# Patient Record
Sex: MALE | Race: WHITE | Employment: STUDENT | URBAN - METROPOLITAN AREA
[De-identification: names, ages, dates, MRNs, and addresses within clinical notes are randomized per-mention and may not be internally consistent; named-entity substitution may affect disease eponyms.]

---
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
## 2023-01-06 ENCOUNTER — OFFICE VISIT (OUTPATIENT)
Dept: PEDIATRICS CLINIC | Facility: CLINIC | Age: 18
End: 2023-01-06
Payer: COMMERCIAL

## 2023-01-06 VITALS
HEIGHT: 67 IN | HEART RATE: 85 BPM | DIASTOLIC BLOOD PRESSURE: 72 MMHG | SYSTOLIC BLOOD PRESSURE: 124 MMHG | BODY MASS INDEX: 30.79 KG/M2 | WEIGHT: 196.19 LBS

## 2023-01-06 DIAGNOSIS — E66.3 PEDIATRIC OVERWEIGHT: ICD-10-CM

## 2023-01-06 DIAGNOSIS — Z00.00 WELL ADULT HEALTH CHECK: Primary | ICD-10-CM

## 2023-01-06 DIAGNOSIS — Z91.010 PEANUT ALLERGY: ICD-10-CM

## 2023-01-06 DIAGNOSIS — Z71.3 DIETARY COUNSELING AND SURVEILLANCE: ICD-10-CM

## 2023-01-06 DIAGNOSIS — Z71.82 EXERCISE COUNSELING: ICD-10-CM

## 2023-01-06 PROBLEM — L91.0 KELOID OF SKIN: Status: ACTIVE | Noted: 2023-01-06

## 2023-01-06 PROCEDURE — 3078F DIAST BP <80 MM HG: CPT | Performed by: PEDIATRICS

## 2023-01-06 PROCEDURE — 99395 PREV VISIT EST AGE 18-39: CPT | Performed by: PEDIATRICS

## 2023-01-06 PROCEDURE — 3074F SYST BP LT 130 MM HG: CPT | Performed by: PEDIATRICS

## 2023-01-06 PROCEDURE — 3008F BODY MASS INDEX DOCD: CPT | Performed by: PEDIATRICS

## 2023-01-06 NOTE — PATIENT INSTRUCTIONS
For ADD eval - call for full eval:    Joy Vargas, PhD Pediatric Neuropsychology [address] [facility]  Frieda Oneil [address] [place], [place], [address] [facility]; [phone]  Dylon Bojorquez, PhD, [facility], [place] [phone]    Stay very active - lots of outdoor time and as little screen time as possible  No sugary drinks - pop, juices, diet drinks, Justo-Aid; water and whole milk only (special occasions - OK); this is key  Avoid processed grains, refined carbohydrates and \"fake\" foods - cereals, things that come in boxes and bags; if you can't grow it, gather it or kill/ it, best not to eat it. Focus on QUALITY of food, not quantity  Eat 2-3 meals a day; no snacking (one exception - child has an early lunch at school and dinner not served until later in evening at home)  A higher protein/fat breakfast does help control hunger hormones throughout the day  No calorie counting - doesn't help and is frustrating  Fresh vegetables, fruits, greens, nuts, eggs, beans/lentils, lean meats and fish should form the bulk of ones diet  Eat things from around the periphery of the grocery store; avoid the center as much as possible  Do NOT be afraid of fat; things higher in fat like olive oil, avocado, butter, lean meats, fish are fine  Eggs are a great thing to eat regularly - worries about the cholesterol in the yolk are unfounded.  An egg (or two) a day is fine      Meningitis B vaccine before going to 4 yr school (if living in dorm or apartment with others)    Next well visit age 19 with adult medicine

## 2023-07-05 ENCOUNTER — OFFICE VISIT (OUTPATIENT)
Dept: FAMILY MEDICINE CLINIC | Facility: CLINIC | Age: 18
End: 2023-07-05
Payer: COMMERCIAL

## 2023-07-05 VITALS
DIASTOLIC BLOOD PRESSURE: 72 MMHG | WEIGHT: 185 LBS | RESPIRATION RATE: 18 BRPM | SYSTOLIC BLOOD PRESSURE: 122 MMHG | BODY MASS INDEX: 29.73 KG/M2 | TEMPERATURE: 98 F | HEIGHT: 66 IN | HEART RATE: 74 BPM | OXYGEN SATURATION: 98 %

## 2023-07-05 DIAGNOSIS — R06.2 WHEEZING: ICD-10-CM

## 2023-07-05 DIAGNOSIS — S01.332A PIERCED EAR INFECTION, LEFT, INITIAL ENCOUNTER: Primary | ICD-10-CM

## 2023-07-05 DIAGNOSIS — R05.2 SUBACUTE COUGH: ICD-10-CM

## 2023-07-05 DIAGNOSIS — L08.9 PIERCED EAR INFECTION, LEFT, INITIAL ENCOUNTER: Primary | ICD-10-CM

## 2023-07-05 PROCEDURE — 3008F BODY MASS INDEX DOCD: CPT | Performed by: FAMILY MEDICINE

## 2023-07-05 PROCEDURE — 99213 OFFICE O/P EST LOW 20 MIN: CPT | Performed by: FAMILY MEDICINE

## 2023-07-05 PROCEDURE — 3078F DIAST BP <80 MM HG: CPT | Performed by: FAMILY MEDICINE

## 2023-07-05 PROCEDURE — 3074F SYST BP LT 130 MM HG: CPT | Performed by: FAMILY MEDICINE

## 2023-07-05 RX ORDER — CLINDAMYCIN HYDROCHLORIDE 300 MG/1
300 CAPSULE ORAL 3 TIMES DAILY
Qty: 21 CAPSULE | Refills: 0 | Status: SHIPPED | OUTPATIENT
Start: 2023-07-05 | End: 2023-07-12

## 2023-07-05 RX ORDER — FLUTICASONE PROPIONATE 110 UG/1
AEROSOL, METERED RESPIRATORY (INHALATION)
Qty: 1 EACH | Refills: 0 | Status: SHIPPED | OUTPATIENT
Start: 2023-07-05 | End: 2023-07-26

## 2023-07-05 RX ORDER — ALBUTEROL SULFATE 90 UG/1
AEROSOL, METERED RESPIRATORY (INHALATION)
Qty: 1 EACH | Refills: 0 | Status: SHIPPED | OUTPATIENT
Start: 2023-07-05

## 2023-07-05 NOTE — PATIENT INSTRUCTIONS
If ear piercing site does not improve within 48 hours or if it worsens before this, please remove the earring and continue the clindamycin. Stop the cephalexin while you are taking th clindamycin. If the cough/wheezing does not resolve with the inhalers please see your PCP for further evaluation. It might be of benefit to start an antihistamine such as Claritin (loratadine) 10 mg once daily.

## 2023-07-09 NOTE — PROGRESS NOTES
Patient request for surgery signed by patient and witnessed and signed by RN. Prescription for and narcotic prescription electronically sent to pharmacy per Dr. Jeffrey Huff order and patient instructed to  prescription before surgery. Pre-Surgical Instruction Handout, Hand Elevation Handout, Dressing Protector Handout, and Post-Operative Instruction Handout given to and reviewed w/patient. All questions and concerns answered; pt verbalized an understanding of all pre-operative teaching. Patient instructed to call the office with any further questions and/or concerns. Patient escorted to surgery scheduling to schedule surgery and post-operative appointments.
Volar proximal phalanx RTH mass   LOV 8/9/21  No changes since LOV here for surgical intervention   Pt complains mass gets in the way of gripping objects due to size of mass
General

## 2023-07-10 ENCOUNTER — HOSPITAL ENCOUNTER (OUTPATIENT)
Age: 18
Discharge: HOME | End: 2023-07-10
Payer: COMMERCIAL

## 2023-07-10 VITALS
TEMPERATURE: 98 F | RESPIRATION RATE: 18 BRPM | SYSTOLIC BLOOD PRESSURE: 127 MMHG | HEIGHT: 66 IN | BODY MASS INDEX: 29.73 KG/M2 | WEIGHT: 185 LBS | OXYGEN SATURATION: 97 % | HEART RATE: 91 BPM | DIASTOLIC BLOOD PRESSURE: 69 MMHG

## 2023-07-10 DIAGNOSIS — T14.8XXA FOREIGN BODY IN SKIN: Primary | ICD-10-CM

## 2023-07-10 PROCEDURE — 99203 OFFICE O/P NEW LOW 30 MIN: CPT | Performed by: NURSE PRACTITIONER

## 2023-07-10 NOTE — ED INITIAL ASSESSMENT (HPI)
Pt with earring stud stuck in left ear lobe. Pt was seen at [facility] last week for ear pain/redness, was unaware of earring stuck in ear at that time.

## 2024-07-02 ENCOUNTER — OFFICE VISIT (OUTPATIENT)
Dept: FAMILY MEDICINE CLINIC | Facility: CLINIC | Age: 19
End: 2024-07-02
Payer: COMMERCIAL

## 2024-07-02 VITALS
HEART RATE: 102 BPM | RESPIRATION RATE: 18 BRPM | HEIGHT: 66 IN | SYSTOLIC BLOOD PRESSURE: 133 MMHG | BODY MASS INDEX: 28.93 KG/M2 | WEIGHT: 180 LBS | TEMPERATURE: 100 F | DIASTOLIC BLOOD PRESSURE: 78 MMHG | OXYGEN SATURATION: 96 %

## 2024-07-02 DIAGNOSIS — J11.1 INFLUENZA-LIKE ILLNESS: ICD-10-CM

## 2024-07-02 DIAGNOSIS — J02.9 SORE THROAT: Primary | ICD-10-CM

## 2024-07-02 LAB
CONTROL LINE PRESENT WITH A CLEAR BACKGROUND (YES/NO): YES YES/NO
KIT LOT #: NORMAL NUMERIC
OPERATOR ID: NORMAL
POCT LOT NUMBER: NORMAL
RAPID SARS-COV-2 BY PCR: NOT DETECTED

## 2024-07-02 PROCEDURE — U0002 COVID-19 LAB TEST NON-CDC: HCPCS | Performed by: NURSE PRACTITIONER

## 2024-07-02 PROCEDURE — 99213 OFFICE O/P EST LOW 20 MIN: CPT | Performed by: NURSE PRACTITIONER

## 2024-07-02 PROCEDURE — 87880 STREP A ASSAY W/OPTIC: CPT | Performed by: NURSE PRACTITIONER

## 2024-07-03 NOTE — PROGRESS NOTES
CHIEF COMPLAINT:     Chief Complaint   Patient presents with    Sore Throat     Symptoms started today : headache, fever, sore throat, and nasal congestion.  OTC: tylenol   NO exposure        HPI:   Demetrius Antoine is a 18 year old male who presents for sore throat, fevers, chills, body aches, and intermittent headaches.  Patient reports symptoms started yesterday.  Denies any cough, wheezing, chest discomfort, or shortness of breath. .  Treating symptoms with tylenol.   Tolerates PO well at home. No n/v/d.  Denies any other aggravating or relieving factors at home. Denies any other treatment attempts prior to arrival. Denies known covid-19 or strep exposure.        Current Outpatient Medications   Medication Sig Dispense Refill    albuterol (PROAIR HFA) 108 (90 Base) MCG/ACT Inhalation Aero Soln 1-2 puffs every 4-6 hours for 2-3 days during waking hours then prn. (Patient not taking: Reported on 7/10/2023) 1 each 0    Clindamycin Phosphate 1 % External Gel Apply 1 Application. topically 2 (two) times daily. Apply thin film to the affected area(s). (Patient not taking: Reported on 7/5/2023) 60 g 5    cephalexin 500 MG Oral Cap Take 1 capsule (500 mg total) by mouth 2 (two) times daily. (Patient not taking: Reported on 7/10/2023) 60 capsule 5    EPINEPHrine 0.3 MG/0.3ML Injection Solution Auto-injector Inject 0.3 mL (1 each total) into the muscle as needed. (Patient not taking: Reported on 6/7/2022) 1 each 1      Past Medical History:    Allergic rhinitis    Exophoria    Extrinsic asthma, unspecified    Mild intermittent asthma, rarely uses Albuterol    Finger fracture, left    fifth finger    Hyperopia of both eyes with astigmatism    Pigmented villonodular synovitis of right hand    Excision Right Thumb GC tumor    PONV (postoperative nausea and vomiting)    Pseudostrabismus    Tears of meniscus and ACL of left knee    Trichiasis of eyelid without entropion    aberrant lash    Visual impairment    glasses       Past Surgical History:   Procedure Laterality Date    Exc hand roseanne deep > 1.5 cm Right 03/28/2022    Excision Right Thumb GC tumor    Knee arthroscopy Left     ACL reconstruction    Other Left     closed reduction, percutaneous pinning left SF     Other Left     5th finger pinning         Social History     Socioeconomic History    Marital status: Single   Tobacco Use    Smoking status: Never    Smokeless tobacco: Never   Vaping Use    Vaping status: Never Used   Substance and Sexual Activity    Alcohol use: No     Alcohol/week: 0.0 standard drinks of alcohol    Drug use: No   Other Topics Concern    Caffeine Concern No    Second-hand smoke exposure No    Right Handed Yes   Social History Narrative    ** Merged History Encounter **         School: [employer]    Grade: 5TH    Activity: Basketball         REVIEW OF SYSTEMS:   GENERAL: + fevers. Notes good appetite  SKIN: no rashes or abnormal skin lesions  HEENT: + sore throat, + sinus symptoms, Denies ear pain  LUNGS: Denies cough, denies shortness of breath or wheezing,   CARDIOVASCULAR: denies chest pain or palpitations   GI: denies N/V/C or abdominal pain  NEURO:+ intermittent headaches. Denies numbness/tingling or weakness.    EXAM:   /78 (BP Location: Right arm, Patient Position: Sitting, Cuff Size: adult)   Pulse 102   Temp 99.5 °F (37.5 °C) (Temporal)   Resp 18   Ht 5' 6\" (1.676 m)   Wt 180 lb (81.6 kg)   SpO2 96%   BMI 29.05 kg/m²   GENERAL: well developed, well nourished,in no apparent distress  SKIN: no rashes,no suspicious lesions  HEAD: atraumatic, normocephalic.    EYES: conjunctiva clear, PERRLA, EOM intact  EARS: TM's intact and without erythema, no bulging, no retraction,no fluid, bony landmarks visualized. No erythema or swelling noted to ear canals or external ears.   NOSE: Nostrils patent, clear nasal discharge, nasal mucosa reddened   THROAT: Oral mucosa pink, moist. Posterior pharynx is  erythematous. No exudates. No tonsillar  hypertrophy noted.  No trismus. Uvula midline with no swelling. Voice clear/normal. No stridor  NECK: Supple, non-tender  LUNGS: clear to auscultation bilaterally, no rales, wheezes or rhonchi. Breathing is non labored.  CARDIO: RRR without murmur  NEURO: Alert & Oriented x 3. Articulation intact. 5/5 UE and LE strength bilaterally. No sensory deficit. Normal muscle tone. Coordination normal. Normal gait. No facial droop.  EXTREMITIES: no cyanosis, clubbing or edema  LYMPH:  No lymphadenopathy.        ASSESSMENT AND PLAN:       ICD-10-CM    1. Sore throat  J02.9 Strep A Assay W/Optic     Rapid Covid-19      2. Influenza-like illness  J11.1         Covid-19 test negative  Rapid strep negative.   Viral panel testing declined.    Discussed physical exam and hpi with pt.  Pt has reassuring physical exam consistent with pharyngitis and  flu like symtpoms.  No signs of pta or retropharyngeal infection.Lungs clear bilat. No respiratory distress noted. We discussed covid-19 vs strep vs other etiologies. Rapid strep test negative. Covid-19 test sent to lab.  Treatment options discussed with patient and explained in detail. We reviewed symptomatic care at home. The risks, benefits and potential side effects of possible medications were reviewed. Alternatives were discussed. Monitoring parameters and expected course outlined. We reviewed self quarantine guidelines. Patient to call PCP or go to emergency department if symptoms fail to respond as outlined, or worsen in any way. The patient agreed with the plan.      Patient Instructions   1. Rest. Drink plenty of fluids.  2. Tylenol/Ibuprofen for pain/fevers.  3. Salt water gargles three times daily  4. Use humidifier at home when possible.  5. The rapid strep test was negative today.   6. Covid-19 test negative.    7. Follow up with PMD in 4-5 days for re-eval. Go to the emergency department immediately if symptoms worsen, change, you develop chest discomfort, wheezing,  shortness of breath, or if you have any concerns.        The patient indicates understanding of these issues and agrees to the plan.

## 2024-07-03 NOTE — PATIENT INSTRUCTIONS
1. Rest. Drink plenty of fluids.  2. Tylenol/Ibuprofen for pain/fevers.  3. Salt water gargles three times daily  4. Use humidifier at home when possible.  5. The rapid strep test was negative today.   6. Covid-19 test negative.    7. Follow up with PMD in 4-5 days for re-eval. Go to the emergency department immediately if symptoms worsen, change, you develop chest discomfort, wheezing, shortness of breath, or if you have any concerns.

## 2024-10-22 ENCOUNTER — PATIENT MESSAGE (OUTPATIENT)
Dept: PEDIATRICS CLINIC | Facility: CLINIC | Age: 19
End: 2024-10-22

## 2025-03-27 ENCOUNTER — OFFICE VISIT (OUTPATIENT)
Dept: FAMILY MEDICINE CLINIC | Facility: CLINIC | Age: 20
End: 2025-03-27
Payer: COMMERCIAL

## 2025-03-27 VITALS
RESPIRATION RATE: 16 BRPM | SYSTOLIC BLOOD PRESSURE: 118 MMHG | TEMPERATURE: 98 F | HEIGHT: 66 IN | WEIGHT: 199 LBS | HEART RATE: 70 BPM | OXYGEN SATURATION: 97 % | DIASTOLIC BLOOD PRESSURE: 62 MMHG | BODY MASS INDEX: 31.98 KG/M2

## 2025-03-27 DIAGNOSIS — Z13.0 SCREENING FOR ENDOCRINE, NUTRITIONAL, METABOLIC AND IMMUNITY DISORDER: ICD-10-CM

## 2025-03-27 DIAGNOSIS — Z13.228 SCREENING FOR ENDOCRINE, NUTRITIONAL, METABOLIC AND IMMUNITY DISORDER: ICD-10-CM

## 2025-03-27 DIAGNOSIS — Z13.6 SCREENING FOR HEART DISEASE: ICD-10-CM

## 2025-03-27 DIAGNOSIS — Z00.00 ANNUAL PHYSICAL EXAM: Primary | ICD-10-CM

## 2025-03-27 DIAGNOSIS — Z13.29 SCREENING FOR ENDOCRINE, NUTRITIONAL, METABOLIC AND IMMUNITY DISORDER: ICD-10-CM

## 2025-03-27 DIAGNOSIS — Z13.21 SCREENING FOR ENDOCRINE, NUTRITIONAL, METABOLIC AND IMMUNITY DISORDER: ICD-10-CM

## 2025-03-27 DIAGNOSIS — Z91.018 FOOD ALLERGY: ICD-10-CM

## 2025-03-27 DIAGNOSIS — Z13.0 SCREENING FOR IRON DEFICIENCY ANEMIA: ICD-10-CM

## 2025-03-27 PROBLEM — E66.3 PEDIATRIC OVERWEIGHT: Status: RESOLVED | Noted: 2018-10-23 | Resolved: 2025-03-27

## 2025-03-27 PROBLEM — E66.9 OBESITY WITHOUT SERIOUS COMORBIDITY: Status: ACTIVE | Noted: 2025-03-27

## 2025-03-27 PROCEDURE — 99385 PREV VISIT NEW AGE 18-39: CPT | Performed by: FAMILY MEDICINE

## 2025-03-27 RX ORDER — EPINEPHRINE 0.3 MG/.3ML
0.3 INJECTION SUBCUTANEOUS AS NEEDED
Qty: 1 EACH | Refills: 1 | Status: SHIPPED | OUTPATIENT
Start: 2025-03-27

## 2025-03-27 NOTE — PROGRESS NOTES
Subjective: male who presents for complete px.  He is a new pt to the clinic.    The patient reports:  No changes in nevi   No changes in vision     Needs epi pen refill.  Has food allergies.        History/Other:    Chief Complaint Reviewed and Verified  Nursing Notes Reviewed and   Verified  Tobacco Reviewed  Allergies Reviewed  Medications Reviewed    Medical History Reviewed  Surgical History Reviewed  Family History   Reviewed  Social History Reviewed         Tobacco:  He has never smoked tobacco.    Current Outpatient Medications   Medication Sig Dispense Refill    EPINEPHrine 0.3 MG/0.3ML Injection Solution Auto-injector Inject 0.3 mL (1 each total) into the muscle as needed. 1 each 1    cephALEXin 500 MG Oral Cap Take 1 capsule (500 mg total) by mouth 2 (two) times daily. 60 capsule 5    Clindamycin Phosphate 1 % External Gel Apply 1 Application  topically 2 (two) times daily. Apply thin film to the affected area(s). 60 g 5         Review of Systems:  See HPI    Objective:   /62 (BP Location: Left arm, Patient Position: Sitting, Cuff Size: adult)   Pulse 70   Temp 97.7 °F (36.5 °C) (Temporal)   Resp 16   Ht 5' 6\" (1.676 m)   Wt 199 lb (90.3 kg)   SpO2 97%   BMI 32.12 kg/m²  Estimated body mass index is 32.12 kg/m² as calculated from the following:    Height as of this encounter: 5' 6\" (1.676 m).    Weight as of this encounter: 199 lb (90.3 kg).    Physical Exam  Constitutional:       General: He is awake.      Appearance: Normal appearance. He is well-developed. He is obese.   HENT:      Right Ear: Tympanic membrane and ear canal normal.      Left Ear: Tympanic membrane and ear canal normal.      Mouth/Throat:      Mouth: Mucous membranes are moist.      Pharynx: Oropharynx is clear.   Eyes:      Pupils: Pupils are equal, round, and reactive to light.   Cardiovascular:      Rate and Rhythm: Normal rate and regular rhythm.      Pulses: Normal pulses.      Heart sounds: No murmur heard.      No friction rub. No gallop.   Pulmonary:      Effort: Pulmonary effort is normal. No respiratory distress.      Breath sounds: No wheezing, rhonchi or rales.   Abdominal:      General: Bowel sounds are normal. There is no distension.      Palpations: Abdomen is soft.      Tenderness: There is no abdominal tenderness.      Comments: Obese    Musculoskeletal:         General: No tenderness.      Cervical back: Neck supple.      Right lower leg: No edema.      Left lower leg: No edema.   Lymphadenopathy:      Cervical: No cervical adenopathy.   Skin:     General: Skin is warm.   Neurological:      General: No focal deficit present.      Mental Status: He is alert.   Psychiatric:         Mood and Affect: Mood normal.         Speech: Speech normal.         Behavior: Behavior normal. Behavior is cooperative.         Assessment & Plan:   1. Annual physical exam    2. Food allergy    3. Screening for endocrine, nutritional, metabolic and immunity disorder    4. Screening for heart disease    5. Screening for iron deficiency anemia         Screening labs-cmp, lipids, cbc, tsh  Refilled epi pen for food allergies   Immunizations-declined meningitis group b vaccine  Patient counseling: Nutrition:stressed importance of moderation in sodium/caffeine intake, saturated fat and cholesterol, caloric balance, sufficient intake of fresh fruits, vegetables, fiber, calcium, iron.  Exercise: stressed the importance of regular exercise   F/u 1 yr for next physical or sooner if needed     Precious Schaeffer DO, 03/27/25, 10:21 PM

## 2025-03-28 ENCOUNTER — PATIENT MESSAGE (OUTPATIENT)
Dept: FAMILY MEDICINE CLINIC | Facility: CLINIC | Age: 20
End: 2025-03-28

## 2025-03-28 DIAGNOSIS — Z13.89 SCREENING FOR NEUROLOGICAL CONDITION: Primary | ICD-10-CM

## 2025-03-28 NOTE — TELEPHONE ENCOUNTER
LOV 03/27/25: New pt. Physical    Pt. states that his therapist would like for him to get an ADHD screening. Is this something that he can come into the office for with , or would patient need a referral for a Psychiatrist?    Please advise!

## 2025-03-28 NOTE — TELEPHONE ENCOUNTER
Would either need to go through psychiatry or get neuro psych testing.  Please let me know what he wants to do and if wants psychiatry-pend  referral

## 2025-05-16 ENCOUNTER — NURSE ONLY (OUTPATIENT)
Dept: FAMILY MEDICINE CLINIC | Facility: CLINIC | Age: 20
End: 2025-05-16
Payer: COMMERCIAL

## 2025-05-16 DIAGNOSIS — Z00.00 ANNUAL PHYSICAL EXAM: ICD-10-CM

## 2025-05-16 DIAGNOSIS — Z13.6 SCREENING FOR HEART DISEASE: ICD-10-CM

## 2025-05-16 DIAGNOSIS — Z13.0 SCREENING FOR IRON DEFICIENCY ANEMIA: ICD-10-CM

## 2025-05-16 DIAGNOSIS — Z13.21 SCREENING FOR ENDOCRINE, NUTRITIONAL, METABOLIC AND IMMUNITY DISORDER: ICD-10-CM

## 2025-05-16 DIAGNOSIS — Z13.0 SCREENING FOR ENDOCRINE, NUTRITIONAL, METABOLIC AND IMMUNITY DISORDER: ICD-10-CM

## 2025-05-16 DIAGNOSIS — Z13.29 SCREENING FOR ENDOCRINE, NUTRITIONAL, METABOLIC AND IMMUNITY DISORDER: ICD-10-CM

## 2025-05-16 DIAGNOSIS — Z13.228 SCREENING FOR ENDOCRINE, NUTRITIONAL, METABOLIC AND IMMUNITY DISORDER: ICD-10-CM

## 2025-05-16 LAB
ALBUMIN SERPL-MCNC: 4.9 G/DL (ref 3.2–4.8)
ALBUMIN/GLOB SERPL: 2 {RATIO} (ref 1–2)
ALP LIVER SERPL-CCNC: 63 U/L (ref 45–117)
ALT SERPL-CCNC: 108 U/L (ref 10–49)
ANION GAP SERPL CALC-SCNC: 7 MMOL/L (ref 0–18)
AST SERPL-CCNC: 99 U/L (ref ?–34)
BASOPHILS # BLD AUTO: 0.02 X10(3) UL (ref 0–0.2)
BASOPHILS NFR BLD AUTO: 0.4 %
BILIRUB SERPL-MCNC: 1 MG/DL (ref 0.3–1.2)
BUN BLD-MCNC: 11 MG/DL (ref 9–23)
CALCIUM BLD-MCNC: 10.2 MG/DL (ref 8.7–10.6)
CHLORIDE SERPL-SCNC: 105 MMOL/L (ref 98–112)
CHOLEST SERPL-MCNC: 159 MG/DL (ref ?–200)
CO2 SERPL-SCNC: 26 MMOL/L (ref 21–32)
CREAT BLD-MCNC: 0.99 MG/DL (ref 0.7–1.3)
EGFRCR SERPLBLD CKD-EPI 2021: 111 ML/MIN/1.73M2 (ref 60–?)
EOSINOPHIL # BLD AUTO: 0.08 X10(3) UL (ref 0–0.7)
EOSINOPHIL NFR BLD AUTO: 1.5 %
ERYTHROCYTE [DISTWIDTH] IN BLOOD BY AUTOMATED COUNT: 12.6 %
FASTING PATIENT LIPID ANSWER: YES
FASTING STATUS PATIENT QL REPORTED: YES
GLOBULIN PLAS-MCNC: 2.4 G/DL (ref 2–3.5)
GLUCOSE BLD-MCNC: 89 MG/DL (ref 70–99)
HCT VFR BLD AUTO: 48.5 % (ref 39–53)
HDLC SERPL-MCNC: 64 MG/DL (ref 40–59)
HGB BLD-MCNC: 17.2 G/DL (ref 13–17.5)
IMM GRANULOCYTES # BLD AUTO: 0.01 X10(3) UL (ref 0–1)
IMM GRANULOCYTES NFR BLD: 0.2 %
LDLC SERPL CALC-MCNC: 82 MG/DL (ref ?–100)
LYMPHOCYTES # BLD AUTO: 2.04 X10(3) UL (ref 1–4)
LYMPHOCYTES NFR BLD AUTO: 38.2 %
MCH RBC QN AUTO: 29.5 PG (ref 26–34)
MCHC RBC AUTO-ENTMCNC: 35.5 G/DL (ref 31–37)
MCV RBC AUTO: 83 FL (ref 80–100)
MONOCYTES # BLD AUTO: 0.49 X10(3) UL (ref 0.1–1)
MONOCYTES NFR BLD AUTO: 9.2 %
NEUTROPHILS # BLD AUTO: 2.7 X10 (3) UL (ref 1.5–7.7)
NEUTROPHILS # BLD AUTO: 2.7 X10(3) UL (ref 1.5–7.7)
NEUTROPHILS NFR BLD AUTO: 50.5 %
NONHDLC SERPL-MCNC: 95 MG/DL (ref ?–130)
OSMOLALITY SERPL CALC.SUM OF ELEC: 285 MOSM/KG (ref 275–295)
PLATELET # BLD AUTO: 218 10(3)UL (ref 150–450)
POTASSIUM SERPL-SCNC: 4 MMOL/L (ref 3.5–5.1)
PROT SERPL-MCNC: 7.3 G/DL (ref 5.7–8.2)
RBC # BLD AUTO: 5.84 X10(6)UL (ref 4.3–5.7)
SODIUM SERPL-SCNC: 138 MMOL/L (ref 136–145)
TRIGL SERPL-MCNC: 68 MG/DL (ref 30–149)
TSI SER-ACNC: 1.26 UIU/ML (ref 0.55–4.78)
VLDLC SERPL CALC-MCNC: 11 MG/DL (ref 0–30)
WBC # BLD AUTO: 5.3 X10(3) UL (ref 4–11)

## 2025-05-16 PROCEDURE — 80053 COMPREHEN METABOLIC PANEL: CPT | Performed by: FAMILY MEDICINE

## 2025-05-16 PROCEDURE — 80061 LIPID PANEL: CPT | Performed by: FAMILY MEDICINE

## 2025-05-16 PROCEDURE — 84443 ASSAY THYROID STIM HORMONE: CPT | Performed by: FAMILY MEDICINE

## 2025-05-16 PROCEDURE — 85025 COMPLETE CBC W/AUTO DIFF WBC: CPT | Performed by: FAMILY MEDICINE

## 2025-05-16 NOTE — PROGRESS NOTES
Patient came in for draw of ordered fasting labs. Patient drawn out of Left AC, x 1 attempt and tolerated well.  Lavender, light green tube drawn.

## 2025-05-19 ENCOUNTER — TELEPHONE (OUTPATIENT)
Dept: FAMILY MEDICINE CLINIC | Facility: CLINIC | Age: 20
End: 2025-05-19

## 2025-05-19 DIAGNOSIS — R79.89 ELEVATED LFTS: Primary | ICD-10-CM

## 2025-05-19 NOTE — TELEPHONE ENCOUNTER
Pt. sent a follow up message in regards to lab results.   Pt. states he has been taking cephalexin in regards to his acne.    Should pt. stop taking this in regards to his elevated liver enzymes?    Please advise.

## 2025-05-19 NOTE — TELEPHONE ENCOUNTER
Notify patient that his LFTs were quite elevated.  He is to abstain from alcohol.  Avoid taking any supplements he may be taking.  Abstain from Tylenol use.  Recommend rechecking in 6 weeks.  If they are still elevated at this time, will recommend a liver ultrasound

## 2025-07-15 ENCOUNTER — LAB ENCOUNTER (OUTPATIENT)
Dept: LAB | Facility: HOSPITAL | Age: 20
End: 2025-07-15
Attending: FAMILY MEDICINE
Payer: COMMERCIAL

## 2025-07-15 DIAGNOSIS — R79.89 ELEVATED LFTS: ICD-10-CM

## 2025-07-15 LAB
ALBUMIN SERPL-MCNC: 5 G/DL (ref 3.2–4.8)
ALP LIVER SERPL-CCNC: 72 U/L (ref 45–117)
ALT SERPL-CCNC: 60 U/L (ref 10–49)
AST SERPL-CCNC: 28 U/L (ref ?–34)
BILIRUB DIRECT SERPL-MCNC: 0.2 MG/DL (ref ?–0.3)
BILIRUB SERPL-MCNC: 0.8 MG/DL (ref 0.3–1.2)
PROT SERPL-MCNC: 7.3 G/DL (ref 5.7–8.2)

## 2025-07-15 PROCEDURE — 80076 HEPATIC FUNCTION PANEL: CPT

## 2025-07-15 PROCEDURE — 36415 COLL VENOUS BLD VENIPUNCTURE: CPT

## 2025-07-17 ENCOUNTER — RESULTS FOLLOW-UP (OUTPATIENT)
Dept: FAMILY MEDICINE CLINIC | Facility: CLINIC | Age: 20
End: 2025-07-17

## 2025-07-17 DIAGNOSIS — R79.89 ELEVATED LFTS: Primary | ICD-10-CM

## 2025-07-17 NOTE — PROGRESS NOTES
LFTs are improving.  ALT only slightly elevated now.  Options would be monitoring for now and continuing present management or obtaining liver US to further evaluate.  Please let me know what he wants to do.

## (undated) DIAGNOSIS — D21.11 BENIGN CONNECTIVE TISSUE NEOPLASM OF FINGER, RIGHT: Primary | ICD-10-CM

## (undated) DEVICE — SPLINT PRECUT ORTHOGLASS 3X12

## (undated) DEVICE — DRAPE SHEET LG

## (undated) DEVICE — GAMMEX® PI HYBRID SIZE 7, STERILE POWDER-FREE SURGICAL GLOVE, POLYISOPRENE AND NEOPRENE BLEND: Brand: GAMMEX

## (undated) DEVICE — SUTURE MONOCRYL 4-0 Y845G

## (undated) DEVICE — FAST-FIX 360 STRAIGHT KNOT                                    PUSHER/CUTTER AND SLOTTED CANNULA SETS: Brand: FAST-FIX

## (undated) DEVICE — FIBERWIRE 2.0 NEEDLE AR-7223

## (undated) DEVICE — TUBING IRR 16FT CNT WV 3 ASCP

## (undated) DEVICE — AMBIENT SUPER TURBOVAC 90 IFS: Brand: COBLATION

## (undated) DEVICE — 3M™ STERI-STRIP™ COMPOUND BENZOIN TINCTURE 40 BAGS/CARTON 4 CARTONS/CASE C1544: Brand: 3M™ STERI-STRIP™

## (undated) DEVICE — SUTURE FIBERLINK FBRWR 2 26IN

## (undated) DEVICE — SOL  .9 1000ML BTL

## (undated) DEVICE — ZIMMER® STERILE DISPOSABLE TOURNIQUET CUFF WITH PLC, DUAL PORT, SINGLE BLADDER, 30 IN. (76 CM)

## (undated) DEVICE — STERILE POLYISOPRENE POWDER-FREE SURGICAL GLOVES: Brand: PROTEXIS

## (undated) DEVICE — COTTON UNDERCAST PADDING,REGULAR FINISH: Brand: WEBRIL

## (undated) DEVICE — KIT ASCP FX ALL INSD ACL STRL

## (undated) DEVICE — SUTURE VICRYL 2-0 FS-1

## (undated) DEVICE — POLAR CARE CUBE COOLING UNIT

## (undated) DEVICE — SUCTION CANISTER, 3000CC,SAFELINER: Brand: DEROYAL

## (undated) DEVICE — SUTURE PROLENE 1 CTX

## (undated) DEVICE — ZIMMER® STERILE DISPOSABLE TOURNIQUET CUFF WITH PLC, DUAL PORT, SINGLE BLADDER, 18 IN. (46 CM)

## (undated) DEVICE — NEEDLE SPINAL 18X3-1/2 PINK.

## (undated) DEVICE — BLADE SHVR COOLCUT 13CM 4MM

## (undated) DEVICE — PAD THRP 16IN WRPON MU LNG STM

## (undated) DEVICE — CHLORAPREP 26ML APPLICATOR

## (undated) DEVICE — PLASTIC HAND: Brand: MEDLINE INDUSTRIES, INC.

## (undated) DEVICE — 3M™ STERI-STRIP™ REINFORCED ADHESIVE SKIN CLOSURES, R1547, 1/2 IN X 4 IN (12 MM X 100 MM), 6 STRIPS/ENVELOPE: Brand: 3M™ STERI-STRIP™

## (undated) DEVICE — SOLUTION  .9 1000ML BTL

## (undated) DEVICE — BRACE ORTH SHRT MED 22IN 18-22

## (undated) DEVICE — Device

## (undated) DEVICE — BURR SHVR COOLCUT 13CM 4MM 8

## (undated) DEVICE — NEEDLE SCORPION AR-13995N

## (undated) DEVICE — FIBERWIRE 2.0 AR-7220

## (undated) DEVICE — 3 ML SYRINGE LUER-LOCK TIP: Brand: MONOJECT

## (undated) DEVICE — NEEDLE HPO 18GA 1.5IN ECLPS

## (undated) DEVICE — LOWER EXTREMITY: Brand: MEDLINE INDUSTRIES, INC.

## (undated) DEVICE — SOL  .9 3000ML

## (undated) DEVICE — COVER SLV UNV DISP NTR STRL LF

## (undated) DEVICE — 60 ML SYRINGE LUER-LOCK TIP: Brand: MONOJECT

## (undated) DEVICE — 1016 S-DRAPE IRRIG POUCH 10/BOX: Brand: STERI-DRAPE™

## (undated) DEVICE — SUT ETHILON 4-0 P-3 699G

## (undated) DEVICE — SUTURE VICRYL 0 CP-1

## (undated) DEVICE — SUTURE ETHIBOND 2 V-37

## (undated) DEVICE — MEDI-VAC NON-CONDUCTIVE SUCTION TUBING: Brand: CARDINAL HEALTH

## (undated) DEVICE — STERILE TETRA-FLEX CF, ELASTIC BANDAGE, 4" X 5.5YD: Brand: TETRA-FLEX™CF

## (undated) DEVICE — ABDOMINAL PAD: Brand: CURITY

## (undated) DEVICE — WEBRIL COTTON UNDERCAST PADDING: Brand: WEBRIL

## (undated) DEVICE — DRAPE SRG 90X60IN BCK TBL CVR

## (undated) DEVICE — KIT ACL TRANS TIB HALL SAW BLD

## (undated) DEVICE — SUTURE FIBERWIRE S AR-7200

## (undated) NOTE — ED AVS SNAPSHOT
Parent/Legal Guardian Access to the Online [employer] Record of a Patient 15to 16Years Old  Return completed form by Secure email to [place] HIM/Medical Records Department: [email or web address].     Requirements and Procedures   Under [facility] MyChart ID and password with another person, that person may be able to view my or my child’s health information, and health information about someone who has authorized me as a MyChart proxy.    ·  I agree that it is my responsibility to select a confident Sign-Up Form and I agree to its terms.        Authorization Form     Please enter Patient’s information below:   Name (last, first, middle initial) __________________________________________   Gender  Male  Female    Last 4 Digits of Social Security Number Parent/Legal Guardian Signature                                  For Patient (1517 years of age)  I agree to allow my parent/legal guardian, named above, online access to my medical information currently available and that may become available as a result

## (undated) NOTE — LETTER
21      Patient: Elio Galicia  : 2005 Visit date: 2021    Dear Sylvain Neely,      I examined your patient in consultation today. He has an enlarging mass of the right thumb. I would recommend surgical excision.     Thank you

## (undated) NOTE — LETTER
Name:  Benito Ashton Year:  12th Grade Class: Student ID No.:   Address:  [address] [id number] Phone:  [phone] (home) [phone] (work) :  14year old   Name Relationship [address] Work Phone Home Phone Mobile Phone unexplained seizure? 12. Do you get more tired or short of breath more quickly than your friends during exercise? HEART HEALTH QUESTIONS ABOUT YOUR FAMILY Yes No   13.  Has any family member or relative  of heart problems or had an unexpected or groin area?     31. Have you had infectious mono within the last month?     32. Do you have any rashes, pressure sores, or other skin problems? 35. Have you had a herpes or MRSA skin infection? 29. Have you ever had a head injury or concussion? Wt 85.4 kg (188 lb 6 oz)   BMI 30.40 kg/m²  97 %ile (Z= 1.90) based on CDC (Boys, 2-20 Years) BMI-for-age based on BMI available as of 6/14/2021.  male    Vision: R 20/    L 20/   Corrected:   Yes/No   MEDICAL NORMAL ABNORMAL FINDINGS   Appearance:  Marfan that allows [de-identified] Assistants or Advanced Nurse Practitioners to sign off on physicals.     [facility] Substance Testing Policy Consent to Random Testing   (This section for high school students only)   0652-4363 school term     As a prerequisite to particip

## (undated) NOTE — LETTER
Name:  Aris Haro Year:  11th Grade Class: Student ID No.:   Address:  [address] [address] Phone:  [phone] (home) [phone] (work) :  10year old   Name Relationship [address] Work Phone Home Phone Mobile Phone seizure? 12. Do you get more tired or short of breath more quickly than your friends during exercise? HEART HEALTH QUESTIONS ABOUT YOUR FAMILY Yes No   13.  Has any family member or relative  of heart problems or had an unexpected or unexplained 31. Have you had infectious mono within the last month?     32. Do you have any rashes, pressure sores, or other skin problems? 35. Have you had a herpes or MRSA skin infection? 29. Have you ever had a head injury or concussion?      35. Have you e Position: Sitting, Cuff Size: large)   Pulse 56   Ht 5' 6\" (1.676 m)   Wt 83.5 kg (184 lb)   BMI 29.70 kg/m²  97 %ile (Z= 1.89) based on CDC (Boys, 2-20 Years) BMI-for-age based on BMI available as of 6/11/2020.  male    Vision: R 20/    L 20/   Corrected: approved a recommendation, consistent with the [facility] [employer], that allows [employer] or Advanced Nurse Practitioners to sign off on physicals.     [facility] Substance Testing Policy Consent to Random Testing   (This section for high school gonzalez educational purposes with acknowledgment.    [id number]

## (undated) NOTE — LETTER
Patient Name: Sheila Lozada  YOB: 2005          MRN number:  [id number]  Date:  10/11/2018  Referring Physician:  Prasad Quevedo    Subjective:  Patient reports he has been without left knee pain; denies  buckling of the knee; feels co Patient has inquired upon brace use with return to sport. I have recommend that generally this has been thought to be appropriate with recommendations ranging neoprene knee sleeve to something with greater support.   I have recommend that he discuss with D

## (undated) NOTE — MR AVS SNAPSHOT
[facility] [place]  [address] [id number] [phone]               Thank you for choosing us for your health care visit with LURDES Aldrich. We are glad to serve you and happy to provide you with this summary of your visit.   Ple chronic liver or kidney disease or ever had a stomach ulcer or GI bleeding, talk with your doctor before using these medicines.)  · Throat lozenges or numbing throat sprays can help reduce pain.  Gargling with warm salt water will also help reduce throat pa Today's Vital Signs     Pulse Temp Weight             77 98.5 °F (36.9 °C) (Oral) 149 lb (95 %*, Z = 1.69)       *Growth percentiles are based on CDC 2-20 Years data         Current Medications      Notice  As of 2/6/2017  1:25 PM    You have not been pres as they start crawling and walking. As your children grow, continue to help them live a healthy active lifestyle.     To lead a healthy active life, families can strive to reach these goals:  o 5 servings of fruits and vegetables a day  o 4 servings of wate

## (undated) NOTE — LETTER
Name:  Ryley Perales Year:  8th Grade Class: Student ID No.:   Address:  [facility] [address] Phone:  [phone] (home)  :  13year old   Name Relationship [address] Work Phone Home Phone Mobile Phone   1.  Zelalem Randolph unexplained car accident, or sudden infant death syndrome)? No   14.  Does anyone in your family have hypertrophic cardiomyopathy, Marfan syndrome, arrhythmogenic right ventricular cardiomyopathy, long QT syndrome, short QT syndrome, Brugada syndrome, or ca 35. Have you had a herpes or MRSA skin infection? No   34. Have you ever had a head injury or concussion? No   35. Have you ever had a hit or blow to the head that caused confusion, prolonged headache, or memory problems? No   36.  Do you have a history of adult)   Ht 5' 4\" (1.626 m)   Wt 61.6 kg (135 lb 12.8 oz)   BMI 23.31 kg/m²  89 %ile (Z= 1.22) based on CDC 2-20 Years BMI-for-age data using vitals from 9/23/2017.  male    Vision: R 20/    L 20/   Corrected:   Yes/No   MEDICAL NORMAL ABNORMAL FINDINGS [de-identified] Assistants or Advanced Nurse Practitioners to sign off on physicals.    [facility] Substance Testing Policy Consent to Random Testing   (This section for high school students only)   6716-4365 school term    As a prerequisite to participation in [place]

## (undated) NOTE — ED AVS SNAPSHOT
Parent/Legal Guardian Access to the Online [employer] Record of a Patient 15to 16Years Old  Return completed form by Secure email to [place] HIM/Medical Records Department: [email or web address].     Requirements and Procedures   Under [facility] MyChart ID and password with another person, that person may be able to view my or my child’s health information, and health information about someone who has authorized me as a MyChart proxy.    ·  I agree that it is my responsibility to select a confident Sign-Up Form and I agree to its terms.        Authorization Form     Please enter Patient’s information below:   Name (last, first, middle initial) __________________________________________   Gender  Male  Female    Last 4 Digits of Social Security Number Parent/Legal Guardian Signature                                  For Patient (1317 years of age)  I agree to allow my parent/legal guardian, named above, online access to my medical information currently available and that may become available as a result

## (undated) NOTE — LETTER
Date & Time: 4/8/2021, 6:59 PM  Patient: Sai Rincon  Encounter Provider(s):    Kerwin Dumont PA-C       To Whom It May Concern:    Fuad Barakat was seen and treated in our department on 4/8/2021.  He should not participate in gym/sports until Mon

## (undated) NOTE — LETTER
Name:  Oj Mckeon Year:  10th Grade Class: Student ID No.:   Address:  [address] [place] [address] Phone:  [phone] (home) [phone] (work) :  11year old   Name Relationship Lgl [address] Work Phone Home Phone Mobile Phone HEART HEALTH QUESTIONS ABOUT YOUR FAMILY Yes No   13.  Has any family member or relative  of heart problems or had an unexpected or unexplained sudden death before age 48?     15. Does anyone in your family have hypertrophic cardiomyopathy, Marfan syndr 32. Do you have any rashes, pressure sores, or other skin problems? 35. Have you had a herpes or MRSA skin infection? 29. Have you ever had a head injury or concussion?      35. Have you ever had a hit or blow to the head that caused confusion, prol 28.12 kg/m²  96 %ile (Z= 1.76) based on CDC (Boys, 2-20 Years) BMI-for-age based on BMI available as of 8/2/2019.  male    Vision: R 20/    L 20/   Corrected:   Yes/No   MEDICAL NORMAL ABNORMAL FINDINGS   Appearance:  Marfan stigmata (kyphoscoliosis, high-a [de-identified] Assistants or Advanced Nurse Practitioners to sign off on physicals.     [facility] Substance Testing Policy Consent to Random Testing   (This section for high school students only)   7697-0467 school term     As a prerequisite to participation in [facility]

## (undated) NOTE — LETTER
[facility] CARE AT [facility] [address]  [address] [address]  Dept: [phone]  Dept Fax: [phone]         December 4, 2017    Patient: Sai Rincon   YOB: 2005   Date of Visit: 12/4/2017       To Whom Margarette Ma

## (undated) NOTE — LETTER
VACCINE ADMINISTRATION RECORD  PARENT / GUARDIAN APPROVAL  Date: 10/23/2018  Vaccine administered to:  Ruth Ann Dailey     : 2005    MRN: [id number]    A copy of the appropriate Centers for Disease Control and Prevention Vaccine Information stateme

## (undated) NOTE — LETTER
VACCINE ADMINISTRATION RECORD  PARENT / GUARDIAN APPROVAL  Date: 2021  Vaccine administered to:  Noelle Capone     : 2005    MRN: [id number]    A copy of the appropriate Centers for Disease Control and Prevention Vaccine Information statemen

## (undated) NOTE — LETTER
[facility] [employer] of Child Health Examination       Student's Name  Radha Neal Da Title                           Date     Signature HEALTH HISTORY          TO BE COMPLETED AND SIGNED BY PARENT/GUARDIAN AND VERIFIED BY HEALTH CARE PROVIDER    ALLERGIES  (Food, drug, insect, other)  Avocado; Peanuts;  Peas MEDICATION  (List all prescribed or taken on a regular basis.)    Current Outpatien PHYSICAL EXAMINATION REQUIREMENTS    Entire section below to be completed by MD//APN/PA       PHYSICAL EXAMINATION REQUIREMENTS (head circumference if <33 years old):   /68 (BP Location: Right arm, Patient Position: Sitting, Cuff Size: adult)   Ht Nose Yes  Neurological Yes    Throat Yes  Musculoskeletal Yes    Mouth/Dental Yes  Spinal examination Yes    Cardiovascular/HTN Yes  Nutritional status Yes    Respiratory Yes                   Diagnosis of Asthma: No Mental Health Yes        Currently Pres

## (undated) NOTE — LETTER
Date & Time: 10/25/2021, 7:51 PM  Patient: Suzanna Amado  Encounter Provider(s):    Karson Alva PA-C       To Whom It May Concern:    peyman Hernandez was seen and treated in our department on 10/25/2021.  He can return to work with these limitations: A

## (undated) NOTE — LETTER
VACCINE ADMINISTRATION RECORD  PARENT / GUARDIAN APPROVAL  Date: 2017  Vaccine administered to:  Rafiq Leal     : 2005    MRN: [id number]    A copy of the appropriate Centers for Disease Control and Prevention Vaccine Information statemen

## (undated) NOTE — LETTER
10/16/2018          To Whom It May Concern:    Bard Hernandez is currently under my medical care and may return to gym as of 10/16/2018 and he will be functionally progressed back to sports.       If you require additional information please contact our of

## (undated) NOTE — LETTER
[facility] [employer] of Child Health Examination       Student's Name  Carlos Casanova Birth D Date  6/14/2023   Signature                                                                                                                                              Title                           Date    (If adding dates to the above immunization hist drug, insect, other)  Peanuts, Avocado, and Peas MEDICATION  (List all prescribed or taken on a regular basis.)    Current Outpatient Medications:   •  EPINEPHrine 0.3 MG/0.3ML Injection Solution Auto-injector, Inject 0.3 mL (1 each total) into the muscle Entire section below to be completed by MD/DO/APN/PA       PHYSICAL EXAMINATION REQUIREMENTS (head circumference if <33 years old):   /74   Pulse 79   Ht 5' 6\"   Wt 85.4 kg (188 lb 6 oz)   BMI 30.40 kg/m²     DIABETES SCREENING  BMI>85% age/sex Cardiovascular/HTN Yes  Nutritional status Yes    Respiratory Yes                   Diagnosis of Asthma: No Mental Health Yes        Currently Prescribed Asthma Medication:            Quick-relief  medication (e.g. Short Acting Beta Antagonist):  No

## (undated) NOTE — LETTER
2/16/2018          To Whom It May Concern:    Brad Hernandez is currently under my medical care. Please excuse him from gym for the rest of the year. If you require additional information please contact our office.         Sincerely,    Bethel Sommers

## (undated) NOTE — LETTER
Name:  Yolis Nur Year:  9th Grade Class: Student ID No.:   Address:  [address] [address] Phone:  [phone] (home) [phone] (work) :  13year old   Name Relationship [address] Work Phone Home Phone Mobile Phone 15. Has any family member or relative  of heart problems or had an unexpected or unexplained sudden death before age 48?     15. Does anyone in your family have hypertrophic cardiomyopathy, Marfan syndrome, arrhythmogenic right ventricular cardiomyopat 35. Have you had a herpes or MRSA skin infection? 29. Have you ever had a head injury or concussion? 35. Have you ever had a hit or blow to the head that caused confusion, prolonged headache, or memory problems? 36.  Do you have a history of sei Years) BMI-for-age based on BMI available as of 10/23/2018.  male    Vision: R 20/    L 20/   Corrected:   Yes/No   MEDICAL NORMAL ABNORMAL FINDINGS   Appearance:  Marfan stigmata (kyphoscoliosis, high-arched palate, pectus excavatum,      arachnodactyly, a [facility] Substance Testing Policy Consent to Random Testing   (This section for high school students only)   0655-5651 school term     As a prerequisite to participation in [employer] activities, we agree that I/our student will not use performance-enhancin

## (undated) NOTE — LETTER
[facility] [employer] of Child Health Examination       Student's Name  Ramírez Neal D Title          MD                 Date  10/23/18   Signature Grade Level/ID#  9th Grade   HEALTH HISTORY          TO BE COMPLETED AND SIGNED BY PARENT/GUARDIAN AND VERIFIED BY HEALTH CARE PROVIDER    ALLERGIES  (Food, drug, insect, other) MEDICATION  (List all prescribed or taken on a regular basis.)     Diagnosis /73   Pulse 85   Resp 18   Ht 5' 5.5\" (1.664 m)   Wt 74.4 kg (164 lb)   BMI 26.88 kg/m²     DIABETES SCREENING  BMI>85% age/sex  No And any two of the following:  Family History No   Ethnic Minority  No          Signs of Insulin Resistance (hyperten Yes        Currently Prescribed Asthma Medication:            Quick-relief  medication (e.g. Short Acting Beta Antagonist): No          Controller medication (e.g. inhaled corticosteroid):   No Other   NEEDS/MODIFICATIONS required in the school setting  No

## (undated) NOTE — ED AVS SNAPSHOT
Brad Hernandez   MRN: [id number]    Department:  [facility] Emergency Department   Date of Visit:  1/20/2018           Disclosure     Insurance plans vary and the physician(s) referred by the ER may not be covered by your plan.  Please contact you tell this physician (or your personal doctor if your instructions are to return to your personal doctor) about any new or lasting problems. The primary care or specialist physician will see patients referred from the [facility] Emergency Department.  Pepe Meredith

## (undated) NOTE — LETTER
9/11/2017              [address] [facility] [address]         To Whom It May Concern,    Analisa Santos ([birth year]) is a patient of our clinic.  Please allow him to receive Ibuprofen 400mg every 6-8 hours as needed for

## (undated) NOTE — LETTER
[facility] CARE AT [facility] [address]  [address] [facility] [address]  Dept: [phone]  Dept Fax: [phone]         December 4, 2017    Patient: Rafiq Leal   YOB: 2005   Date of Visit: 12/4/2017       To Whom Margarette Barcenas

## (undated) NOTE — LETTER
Date: 12/6/2021    Patient Name: Ashia Garvin          To Whom it may concern: This letter has been written at the patient's request. The above patient was seen at the [facility] for treatment of a medical condition.     Patient can retu

## (undated) NOTE — LETTER
3/2/2018          To Whom It May Concern:    Barb Carney is currently under my medical care. Please allow him to use elevator to transfer to classes for 4 weeks. Please excuse him from gym for 6 months.      If you require additional information plea

## (undated) NOTE — LETTER
Date & Time: 10/25/2021, 7:52 PM  Patient: Suzanna Amado  Encounter Provider(s):    Karson Alva PA-C       To Whom It May Concern:    peyman Hernandez was seen and treated in our department on 10/25/2021.  He should not participate in gym/sports until r

## (undated) NOTE — LETTER
Name:  Ronnie Moody Year:  10th Grade Class: Student ID No.:   Address:  [address] [address] Phone:  [phone] (home) [phone] (work) :  10year old   Name Relationship [address] Work Phone Home Phone Mobile Phone HEART HEALTH QUESTIONS ABOUT YOUR FAMILY Yes No   13.  Has any family member or relative  of heart problems or had an unexpected or unexplained sudden death before age 48?     15. Does anyone in your family have hypertrophic cardiomyopathy, Marfan syndr 32. Do you have any rashes, pressure sores, or other skin problems? 35. Have you had a herpes or MRSA skin infection? 29. Have you ever had a head injury or concussion?      35. Have you ever had a hit or blow to the head that caused confusion, prol 28.12 kg/m²  96 %ile (Z= 1.76) based on CDC (Boys, 2-20 Years) BMI-for-age based on BMI available as of 8/2/2019.  male    Vision: R 20/    L 20/   Corrected:   Yes/No   MEDICAL NORMAL ABNORMAL FINDINGS   Appearance:  Marfan stigmata (kyphoscoliosis, high-a [de-identified] Assistants or Advanced Nurse Practitioners to sign off on physicals.     [facility] Substance Testing Policy Consent to Random Testing   (This section for high school students only)   1066-5793 school term     As a prerequisite to participation in [facility]

## (undated) NOTE — LETTER
[facility] [employer] of Child Health Examination       Student's Name  Cristhian DA SILVA Title                           Date  06/11/2020   Signature Grade Level/ID#  11th Grade   HEALTH HISTORY          TO BE COMPLETED AND SIGNED BY PARENT/GUARDIAN AND VERIFIED BY HEALTH CARE PROVIDER    ALLERGIES  (Food, drug, insect, other)  Peanuts; Pecan;  Avocado; Peas MEDICATION  (List all prescribed or taken on Bone/Joint problem/injury/scoliosis?    Yes   No  Parent/Guardian Signature                                          Date     PHYSICAL EXAMINATION REQUIREMENTS    Entire section below to be completed by MD/DO/APN/PA       PHYSICAL EXAMINATION REQUIREMENTS ( Ears Yes                      Screen result: Gastrointestinal Yes    Eyes Yes     Screen result:   Genito-Urinary Yes  LMP   Nose Yes  Neurological Yes    Throat Yes  Musculoskeletal Yes    Mouth/Dental Yes  Spinal examination Yes    Cardiovascular/HTN Yes [facility] [address]  [phone]   Rev 11/15                                                                    Printed by the [facility]